# Patient Record
(demographics unavailable — no encounter records)

---

## 2025-01-06 NOTE — ASSESSMENT
[FreeTextEntry1] : Assessment: - Summary : 60-year-old female with right hip arthritis and lumbar radiculopathy, presenting with intermittent back and right hip pain. Patient has not yet received high-quality physical therapy and is hesitant about surgical intervention. - Problems : - Right hip arthritis  - Lumbar radiculopathy  - Chronic pain  - Differential Diagnosis : - Osteoarthritis of the hip  - Lumbar disc herniation  - Spinal stenosis  - Trochanteric bursitis  Plan: - Summary : The treatment plan focuses on conservative management with medication and physical therapy. The patient's preference for non-surgical approaches is taken into account however she is advised to continue to follow up with orthopedics for ongoing evaluation of need for KENA.   - Plan : - Prescribe Naproxen 500mg twice daily as needed  - Continue Tylenol 1g every 8 hours as needed  - Refer to [facility] for high-quality physical therapy  - Follow-up appointment in 6 weeks  - Advised patient to call if unable to schedule PT appointment for assistance  - Discussed potential need for [employer] application  - Monitor progress and reassess need for surgical intervention with Dr. Abreu.   Ralph Perez DO, FAAPMR Attending Physician, Interventional Pain Medicine  Department of Physical Medicine and Rehabilitation Formerly [facility] | [place] [address] [address] [place] [address] 6th [place] [address] Email: [email or web address]  I have spent greater than 30 minutes preparing to see the patient, collecting relevant history, performing a thorough history and physical examination, counseling the patient regarding my findings ordering the appropriate therapies and tests, communicating with other relevant healthcare professionals, documenting my encounter and coordinating care.

## 2025-01-06 NOTE — HISTORY OF PRESENT ILLNESS
[Back] : back [2] : a minimum pain level of 2/10 [6] : a maximum pain level of 6/10 [FreeTextEntry1] : Subjective: - Summary : 60-year-old female presents with intermittent back pain and right hip discomfort. Patient expresses reluctance towards hip replacement surgery and dissatisfaction with previous physical therapy experiences. - Chief Complaint (CC) : Right hip and back pain - History of Present Illness (HPI) : Patient reports intermittent back pain and right hip discomfort. The pain is localized to the right hip area and sometimes extends to the back. No radiation of pain down the leg is reported. The patient describes the pain as 'not really, really bad'. She has been taking Tylenol for pain management. The patient expresses a preference for conservative treatment options before considering hip replacement surgery. - Past Medical History : Right upper lobe mass removal (lung), Right hip arthritis, Lumbar radiculopathy - Past Surgical History : Status post right hip intra-articular injection

## 2025-01-06 NOTE — PHYSICAL EXAM
[FreeTextEntry1] : Gen: A+O x 3 in NAD Psych: Normal mood and affect. Responds appropriately to commands Eyes: Anicteric. No discharge. EOMI. Resp: Breathing unlabored CV: Well Perfused Ext: No c/c/e  Skin: No lesions noted   Gait: Non antalgic, normal reciprocating heel to toe, able to stand on toes and heels. Tandem gait intact  Negative romberg   Stance: No Trendelenburg sign present with single leg stance     Neuro:   Tone: Normal. No clonus. Sensation: Grossly intact to light touch and pinprick bilateral lower limbs. Proprioception: Intact at big toes bilaterally. Reflexes: 2+  symmetric knee jerk, medial hamstring, ankle jerk. Plantars downgoing bilaterally.   MMT:   5/5 in b/l lower extremities      Spine:   Inspection: Alignment is midline, with no evidence of scoliosis. Iliac crest heights and PSIS heights level. No rib hump noted upon forward flexion of the trunk.    Palpation: There is + tenderness over the midline spinous processes, paravertebral muscles, PSIS,   Neg TTP over sciatic notch, greater trochanters bilaterally.     Lumbar ROM: Flexion, extension, side-bending, rotation, limited in most planes  pain with lateral flexion  pain with oblique extension  pain with lateral rotation      Hip ROM:  + Shopping cart sign    -Pain at terminal ROM bilaterally.   -FAIR, FABERE negative bilaterally.            -SLR positive bilaterally    -Crossed SLR positive bilaterally.    -Slump test positive bilaterally.    -Femoral Nerve Stretch test negative bilaterally   -Crossed Femoral Nerve test negative bilaterally        -Facet loading (Kemps Test) did not produce low back pain.   -Midline Sacral Thrust did not produce pain.      -Standing Flexion Test negative   -Seated Flexion Test negative    -SIJ pain not elicited with dropping the involved leg off the table while the contralateral hip was held in flexion (Gaenslens Test)   - Negative Hip extension and ilium rotation (Yeomans Test)   - Negative Iliac Compression and Distraction of the ASIS   -Kelsey Finger Test to SIJ negative on two attempts.

## 2025-02-28 NOTE — HISTORY OF PRESENT ILLNESS
[FreeTextEntry1] : Patient presents today for follow-up overall she is doing well.  She radicular symptoms hip pain is under control has not yet done physical therapy.

## 2025-02-28 NOTE — ASSESSMENT
[FreeTextEntry1] : Patient with spinal stenosis and osteoarthritis of the hip presents today for follow-up as needed physical therapy discussed importance of physical therapy helped her find a location which takes her insurance is convenient for her.  Refilled naproxen 500 twice daily.  Follow-up in 6 weeks  Ralph Perez DO, FAAPMR Attending Physician, Interventional Pain Medicine  Department of Physical Medicine and Rehabilitation [place] [address] [place] [address] [address] [place] [address] Email: [email or web address]  I have spent greater than 30 minutes preparing to see the patient, collecting relevant history, performing a thorough history and physical examination, counseling the patient regarding my findings ordering the appropriate therapies and tests, communicating with other relevant healthcare professionals, documenting my encounter and coordinating care.

## 2025-02-28 NOTE — ASSESSMENT
[FreeTextEntry1] : Patient with spinal stenosis and osteoarthritis of the hip presents today for follow-up as needed physical therapy discussed importance of physical therapy helped her find a location which takes her insurance is convenient for her.  Refilled naproxen 500 twice daily.  Follow-up in 6 weeks  Ralph Perez DO, FAAPMR Attending Physician, Interventional Pain Medicine  Department of Physical Medicine and Rehabilitation [place] [address] [address] [place] [address] [address] [place] [address] Email: [email or web address]  I have spent greater than 30 minutes preparing to see the patient, collecting relevant history, performing a thorough history and physical examination, counseling the patient regarding my findings ordering the appropriate therapies and tests, communicating with other relevant healthcare professionals, documenting my encounter and coordinating care.

## 2025-03-07 NOTE — END OF VISIT
[FreeTextEntry3] : Documented by Maryan Alonzo acting as a scribe for Dr. Merrill Abreu. 03/05/2025  All medical record entries made by the Scribe were at my, Dr. Merrill Abreu, direction and personally dictated by me on 03/05/2025. I have reviewed the chart and agree that the record accurately reflects my personal performance of the history, physical exam, assessment and plan. I have also personally directed, reviewed, and agreed with the chart.

## 2025-03-07 NOTE — PHYSICAL EXAM
[de-identified] : General appearance: well nourished and hydrated, pleasant, alert and oriented x 3, cooperative.   HEENT: normocephalic, EOM intact, wearing mask, external auditory canal clear.   Cardiovascular: no lower leg edema, no varicosities, dorsalis pedis pulses palpable and symmetric.   Lymphatics: no palpable lymphadenopathy, no lymphedema.   Neurologic: sensation is normal, no muscle weakness in upper or lower extremities, patella tendon reflexes present and symmetric.   Dermatologic: skin moist, warm, no rash.   Spine: cervical spine with normal lordosis and painless range of motion, thoracic spine with normal kyphosis and painless range of motion, lumbosacral spine with normal lordosis and painless range of motion.  No tenderness to palpation along midline spine and paraspinal musculature.  Sacroiliac joints nontender bilaterally. Negative SLR and crossed SLR tests bilaterally. Gait: She presents with no assistive device, she transitions easily from seated to standing, and demonstrates a stable, non-antalgic, gait pattern.   Limb lengths:  clinically similar.  Right hip: - Focal soft tissue swelling: none - Ecchymosis: none - Erythema: none - Wounds: none - Tenderness: non tender to palpation - ROM:    - Flexion: 90   - Extension: 0   - Adduction: 5   - Abduction: 15   - Internal rotation in 90 degrees of hip flexion: 5 degrees oblique external   - External rotation in 90 degrees of hip flexion: 20 - AMAIRLIS: very stiff; but painless. - FADIR: very stiff; but painless. - Makenna: negative - Stinchfield: positive - Flexor power: 4+/5 - Abductor power: 5/5   [de-identified] : AP pelvis and right hip x-rays were taken today. These demonstrate for the right hip severe osteoarthritis with bone-on-bone superior articulation, TONNIS 3. There is associated pincer deformity, there's as well as coxa profunda deformity, there's no radiographic evidence of primary osteonecrosis.   Left hip is also visualized and demonstrates mild to moderate osteoarthritis with predominantly inferior medial joint space narrowing.  Their combined pincer and coxa profunda deformities, there is no radiographic evidence of primary osteonecrosis.   The right hip arthritis has worsened as compared to the April 2024 X-rays.

## 2025-03-07 NOTE — PHYSICAL EXAM
[de-identified] : General appearance: well nourished and hydrated, pleasant, alert and oriented x 3, cooperative.   HEENT: normocephalic, EOM intact, wearing mask, external auditory canal clear.   Cardiovascular: no lower leg edema, no varicosities, dorsalis pedis pulses palpable and symmetric.   Lymphatics: no palpable lymphadenopathy, no lymphedema.   Neurologic: sensation is normal, no muscle weakness in upper or lower extremities, patella tendon reflexes present and symmetric.   Dermatologic: skin moist, warm, no rash.   Spine: cervical spine with normal lordosis and painless range of motion, thoracic spine with normal kyphosis and painless range of motion, lumbosacral spine with normal lordosis and painless range of motion.  No tenderness to palpation along midline spine and paraspinal musculature.  Sacroiliac joints nontender bilaterally. Negative SLR and crossed SLR tests bilaterally. Gait: She presents with no assistive device, she transitions easily from seated to standing, and demonstrates a stable, non-antalgic, gait pattern.   Limb lengths:  clinically similar.  Right hip: - Focal soft tissue swelling: none - Ecchymosis: none - Erythema: none - Wounds: none - Tenderness: non tender to palpation - ROM:    - Flexion: 90   - Extension: 0   - Adduction: 5   - Abduction: 15   - Internal rotation in 90 degrees of hip flexion: 5 degrees oblique external   - External rotation in 90 degrees of hip flexion: 20 - AMARILIS: very stiff; but painless. - FADIR: very stiff; but painless. - Makenna: negative - Stinchfield: positive - Flexor power: 4+/5 - Abductor power: 5/5   [de-identified] : AP pelvis and right hip x-rays were taken today. These demonstrate for the right hip severe osteoarthritis with bone-on-bone superior articulation, TONNIS 3. There is associated pincer deformity, there's as well as coxa profunda deformity, there's no radiographic evidence of primary osteonecrosis.   Left hip is also visualized and demonstrates mild to moderate osteoarthritis with predominantly inferior medial joint space narrowing.  Their combined pincer and coxa profunda deformities, there is no radiographic evidence of primary osteonecrosis.   The right hip arthritis has worsened as compared to the April 2024 X-rays.

## 2025-03-07 NOTE — HISTORY OF PRESENT ILLNESS
[de-identified] : 03/05/2025: 60-year-old female following up for right hip osteoarthritis we last saw her in July of 2024, at which time we elected to continue with non-surgical treatment options. She's been following primarily with Dr. Greenberg since then, both for spinal stenosis and her right hip arthritis. She underwent some sort of spinal injection last year, which she cannot say was helpful or not, given that she was helping her daughter move at that time, which was a very physical endeavor. She continues to use naproxen as needed for pain about two to three times a week, as needed. She did her last physical therapy course at the end of 2024 and is planning to resume a new series, this upcoming Monday. She reports that she mostly keeps to herself at home, but does go out when necessary for shopping and doctor's visits. She reports that the hip pain is not so much her limiting factor as is chronic shortness of breath, as she is a lung cancer survivor. She continues to ambulate without the use of any assistive devices. Overall, she feels that the intensity of her right hip pain is about the same as it was the last time that we saw each other without significant progression or improvement.  07/16/2024: 60 year old female presenting for follow up evaluation of right hip osteoarthritis. We last saw her 2 months ago at which time she elected to continue with conservative treatment modalities, having been uninterested at that time in discussing surgical intervention. She did follow up with Dr. Greenberg regarding lumbar degenerative disease and did undergo an epidural injection on 07/02. She felt the injection was helpful for her back pain and some of her hip pain. She does report ongoing persistence of her hip and thigh pain on the right side. She is taking naproxen occasionally as needed. She has not participated in PT in at least 6 weeks. She reports that she self-discontinued PT, because she did not feel that it was helpful. She does report that she walks a lot and does some home exercises.  05/14/2024: 59 year old female presenting for follow up evaluation of right hip osteoarthritis to review her right hip MRI and clinical progress as compared to her first visit 2 weeks ago. She has been attending PT but does not feel she is getting any benefit. She reports her pain has not changed since the last visit. She is taking Naproxen and Tylenol as needed for pain.  04/30/2024: 59 year old female presenting for initial evaluation of right hip pain present for the past year, which is primarily localized over the anterior groin. Pain is worse when walking, tying shoelaces, and transitioning from sitting to standing. She rates pain at 9/10. She is taking naproxen and Tylenol for relief. She went for PT until 3 weeks ago, and will be starting new PT session later this week. She has a walking distance limitation of 1 city block. She also notes some lumbar pain as well. She reports a history of some type of lumbar injection 10 years ago, which was helpful at that time for pain.  PMH significant for history of lung cancer, in remission for the past 3 years History of hysterectomy No relevant allergies

## 2025-03-07 NOTE — HISTORY OF PRESENT ILLNESS
[de-identified] : 03/05/2025: 60-year-old female following up for right hip osteoarthritis we last saw her in July of 2024, at which time we elected to continue with non-surgical treatment options. She's been following primarily with Dr. Greenberg since then, both for spinal stenosis and her right hip arthritis. She underwent some sort of spinal injection last year, which she cannot say was helpful or not, given that she was helping her daughter move at that time, which was a very physical endeavor. She continues to use naproxen as needed for pain about two to three times a week, as needed. She did her last physical therapy course at the end of 2024 and is planning to resume a new series, this upcoming Monday. She reports that she mostly keeps to herself at home, but does go out when necessary for shopping and doctor's visits. She reports that the hip pain is not so much her limiting factor as is chronic shortness of breath, as she is a lung cancer survivor. She continues to ambulate without the use of any assistive devices. Overall, she feels that the intensity of her right hip pain is about the same as it was the last time that we saw each other without significant progression or improvement.  07/16/2024: 60 year old female presenting for follow up evaluation of right hip osteoarthritis. We last saw her 2 months ago at which time she elected to continue with conservative treatment modalities, having been uninterested at that time in discussing surgical intervention. She did follow up with Dr. Greenberg regarding lumbar degenerative disease and did undergo an epidural injection on 07/02. She felt the injection was helpful for her back pain and some of her hip pain. She does report ongoing persistence of her hip and thigh pain on the right side. She is taking naproxen occasionally as needed. She has not participated in PT in at least 6 weeks. She reports that she self-discontinued PT, because she did not feel that it was helpful. She does report that she walks a lot and does some home exercises.  05/14/2024: 59 year old female presenting for follow up evaluation of right hip osteoarthritis to review her right hip MRI and clinical progress as compared to her first visit 2 weeks ago. She has been attending PT but does not feel she is getting any benefit. She reports her pain has not changed since the last visit. She is taking Naproxen and Tylenol as needed for pain.  04/30/2024: 59 year old female presenting for initial evaluation of right hip pain present for the past year, which is primarily localized over the anterior groin. Pain is worse when walking, tying shoelaces, and transitioning from sitting to standing. She rates pain at 9/10. She is taking naproxen and Tylenol for relief. She went for PT until 3 weeks ago, and will be starting new PT session later this week. She has a walking distance limitation of 1 city block. She also notes some lumbar pain as well. She reports a history of some type of lumbar injection 10 years ago, which was helpful at that time for pain.  PMH significant for history of lung cancer, in remission for the past 3 years History of hysterectomy No relevant allergies

## 2025-03-07 NOTE — DISCUSSION/SUMMARY
[de-identified] : IMP: 60-year-old female with right hip osteoarthritis. - She remains clinically stable even though she has had some radiographic deterioration; neither she nor I think that hip replacement is necessary at this time. - I did encourage her to continue with physical therapy as currently planned and to continue using Tylenol and naproxen as needed. - She will return to the office when she is back from her trip to [place] in the late summer time, with no new x-rays needed.  - We reviewed that she is a candidate to consider hip replacement whenever her symptoms worsen, which they eventually will.

## 2025-03-07 NOTE — DISCUSSION/SUMMARY
[de-identified] : IMP: 60-year-old female with right hip osteoarthritis. - She remains clinically stable even though she has had some radiographic deterioration; neither she nor I think that hip replacement is necessary at this time. - I did encourage her to continue with physical therapy as currently planned and to continue using Tylenol and naproxen as needed. - She will return to the office when she is back from her trip to [place] in the late summer time, with no new x-rays needed.  - We reviewed that she is a candidate to consider hip replacement whenever her symptoms worsen, which they eventually will.

## 2025-03-20 NOTE — DISCUSSION/SUMMARY
[de-identified] : IMP: 60-year-old female with severe right hip osteoarthritis. - I agree that it would be reasonable for her to undergo a right total hip replacement at this time.  - We discussed the details of the procedure, the expected recovery period, and the expected outcome. We discussed the likelihood of satisfaction after complete recovery, and the potential causes of dissatisfaction. The importance of active patient participation in the rehabilitation protocol was emphasized, along with its influence on short and long-term outcomes. We discussed the risks, benefits, and alternatives of surgery at length. Specific risks of total hip replacement were discussed in detail. We discussed the risk of surgical site complications including but not limited to: surgical site infection, wound healing complications, bone fracture, tendon or ligament injury, neurovascular injury, hemorrhage, postoperative stiffness or instability, persistent pain, limb length discrepancy, and need for reoperation. We discussed surgical blood loss and the possible need for blood transfusion. We discussed the risk of perioperative medical complications, including but not limited to catheter-associated urinary tract infection, venous thromboembolism and other cardiopulmonary complications. We discussed anesthetic options and the risk of anesthesia-related complications. We discussed the potential benefits of surgery including the potential to improve the current clinical condition through operative intervention. I emphasized that there are alternatives to surgical intervention including continued conservative management, though such a course could yield less than optimal results in this particular patient. A model was used to demonstrate the operation and to discuss bearing surfaces of the implants. We discussed implant fixation methods; my plan would be to use fully cementless fixation in this case. We discussed the various surgical approaches to the hip; I think that an anterior approach would be appropriate in this case. We discussed that it is relatively common following anterior approach hip arthroplasty to develop numbness of the lateral thigh secondary to injury to the branches of the lateral femoral cutaneous nerve, which in most cases does improve over the course of the first postoperative year, but which can also be permanent. We discussed the durability of prosthetic hips and limitations related to wear, osteolysis and loosening. All questions were answered to the patient's satisfaction. The patient was given a copy of my preoperative packet with additional information about the procedure. I asked the patient to either call back or schedule a followup appointment for any additional questions or concerns regarding the procedure. - She will be booked for surgery at a convenient time with routine medical clearance.

## 2025-03-20 NOTE — PHYSICAL EXAM
[de-identified] : General appearance: well nourished and hydrated, pleasant, alert and oriented x 3, cooperative. HEENT: normocephalic, EOM intact, wearing mask, external auditory canal clear. Cardiovascular: no lower leg edema, no varicosities, dorsalis pedis pulses palpable and symmetric. Lymphatics: no palpable lymphadenopathy, no lymphedema. Neurologic: sensation is normal, no muscle weakness in upper or lower extremities, patella tendon reflexes present and symmetric. Dermatologic: skin moist, warm, no rash. Spine: cervical spine with normal lordosis and painless range of motion, thoracic spine with normal kyphosis and painless range of motion, lumbosacral spine with normal lordosis and painless range of motion. No tenderness to palpation along midline spine and paraspinal musculature. Sacroiliac joints nontender bilaterally. Negative SLR and crossed SLR tests bilaterally. Gait: She presents with no assistive device, she transitions easily from seated to standing, and demonstrates a stable, non-antalgic, gait pattern.  Limb lengths: clinically similar.  Right hip: - Focal soft tissue swelling: none - Ecchymosis: none - Erythema: none - Wounds: none - Tenderness: non tender to palpation - ROM: - Flexion: 90 - Extension: 0 - Adduction: 5 - Abduction: 15 - Internal rotation in 90 degrees of hip flexion: 5 degrees oblique external - External rotation in 90 degrees of hip flexion: 20 - AMARILIS: very stiff; but painless. - FADIR: very stiff; but painless. - Makenna: negative - Stinchfield: positive - Flexor power: 4+/5 - Abductor power: 5/5

## 2025-03-20 NOTE — HISTORY OF PRESENT ILLNESS
[de-identified] : 03/19/2025: 60-year-old female following up for right hip osteoarthritis, we last saw her two weeks ago, at which time she preferred to continue with non-surgical  management. She reports that over the past couple of weeks, she's had a change of heart. She does not wish to continue "popping pills", which at this point are  mostly naproxen. She does not wish to continue worrying about her hips, and she feels that if hip replacement will eventually be required, she would rather take  care of this now rather than having to deal with it later. She was previously planning a trip to [place] around her birthday in June. She is now interested in canceling  those plans so she can pursue right total hip replacement, and she would like to have a discussion regarding this today   03/05/2025: 60-year-old female following up for right hip osteoarthritis we last saw her in July of 2024, at which time we elected to continue with non-surgical treatment options. She's been following primarily with Dr. Greenberg since then, both for spinal stenosis and her right hip arthritis. She underwent some sort of spinal injection last year, which she cannot say was helpful or not, given that she was helping her daughter move at that time, which was a very physical endeavor. She continues to use naproxen as needed for pain about two to three times a week, as needed. She did her last physical therapy course at the end of 2024 and is planning to resume a new series, this upcoming Monday. She reports that she mostly keeps to herself at home, but does go out when necessary for shopping and doctor's visits. She reports that the hip pain is not so much her limiting factor as is chronic shortness of breath, as she is a lung cancer survivor. She continues to ambulate without the use of any assistive devices. Overall, she feels that the intensity of her right hip pain is about the same as it was the last time that we saw each other without significant progression or improvement.  07/16/2024: 60 year old female presenting for follow up evaluation of right hip osteoarthritis. We last saw her 2 months ago at which time she elected to continue with conservative treatment modalities, having been uninterested at that time in discussing surgical intervention. She did follow up with Dr. Greenberg regarding lumbar degenerative disease and did undergo an epidural injection on 07/02. She felt the injection was helpful for her back pain and some of her hip pain. She does report ongoing persistence of her hip and thigh pain on the right side. She is taking naproxen occasionally as needed. She has not participated in PT in at least 6 weeks. She reports that she self-discontinued PT, because she did not feel that it was helpful. She does report that she walks a lot and does some home exercises.  05/14/2024: 59 year old female presenting for follow up evaluation of right hip osteoarthritis to review her right hip MRI and clinical progress as compared to her first visit 2 weeks ago. She has been attending PT but does not feel she is getting any benefit. She reports her pain has not changed since the last visit. She is taking Naproxen and Tylenol as needed for pain.  04/30/2024: 59 year old female presenting for initial evaluation of right hip pain present for the past year, which is primarily localized over the anterior groin. Pain is worse when walking, tying shoelaces, and transitioning from sitting to standing. She rates pain at 9/10. She is taking naproxen and Tylenol for relief. She went for PT until 3 weeks ago, and will be starting new PT session later this week. She has a walking distance limitation of 1 city block. She also notes some lumbar pain as well. She reports a history of some type of lumbar injection 10 years ago, which was helpful at that time for pain.  PMH significant for history of lung cancer, in remission for the past 3 years History of hysterectomy No relevant allergies

## 2025-03-20 NOTE — DISCUSSION/SUMMARY
[de-identified] : IMP: 60-year-old female with severe right hip osteoarthritis. - I agree that it would be reasonable for her to undergo a right total hip replacement at this time.  - We discussed the details of the procedure, the expected recovery period, and the expected outcome. We discussed the likelihood of satisfaction after complete recovery, and the potential causes of dissatisfaction. The importance of active patient participation in the rehabilitation protocol was emphasized, along with its influence on short and long-term outcomes. We discussed the risks, benefits, and alternatives of surgery at length. Specific risks of total hip replacement were discussed in detail. We discussed the risk of surgical site complications including but not limited to: surgical site infection, wound healing complications, bone fracture, tendon or ligament injury, neurovascular injury, hemorrhage, postoperative stiffness or instability, persistent pain, limb length discrepancy, and need for reoperation. We discussed surgical blood loss and the possible need for blood transfusion. We discussed the risk of perioperative medical complications, including but not limited to catheter-associated urinary tract infection, venous thromboembolism and other cardiopulmonary complications. We discussed anesthetic options and the risk of anesthesia-related complications. We discussed the potential benefits of surgery including the potential to improve the current clinical condition through operative intervention. I emphasized that there are alternatives to surgical intervention including continued conservative management, though such a course could yield less than optimal results in this particular patient. A model was used to demonstrate the operation and to discuss bearing surfaces of the implants. We discussed implant fixation methods; my plan would be to use fully cementless fixation in this case. We discussed the various surgical approaches to the hip; I think that an anterior approach would be appropriate in this case. We discussed that it is relatively common following anterior approach hip arthroplasty to develop numbness of the lateral thigh secondary to injury to the branches of the lateral femoral cutaneous nerve, which in most cases does improve over the course of the first postoperative year, but which can also be permanent. We discussed the durability of prosthetic hips and limitations related to wear, osteolysis and loosening. All questions were answered to the patient's satisfaction. The patient was given a copy of my preoperative packet with additional information about the procedure. I asked the patient to either call back or schedule a followup appointment for any additional questions or concerns regarding the procedure. - She will be booked for surgery at a convenient time with routine medical clearance.

## 2025-03-20 NOTE — HISTORY OF PRESENT ILLNESS
[de-identified] : 03/19/2025: 60-year-old female following up for right hip osteoarthritis, we last saw her two weeks ago, at which time she preferred to continue with non-surgical  management. She reports that over the past couple of weeks, she's had a change of heart. She does not wish to continue "popping pills", which at this point are  mostly naproxen. She does not wish to continue worrying about her hips, and she feels that if hip replacement will eventually be required, she would rather take  care of this now rather than having to deal with it later. She was previously planning a trip to [place] around her birthday in June. She is now interested in canceling  those plans so she can pursue right total hip replacement, and she would like to have a discussion regarding this today   03/05/2025: 60-year-old female following up for right hip osteoarthritis we last saw her in July of 2024, at which time we elected to continue with non-surgical treatment options. She's been following primarily with Dr. Greenberg since then, both for spinal stenosis and her right hip arthritis. She underwent some sort of spinal injection last year, which she cannot say was helpful or not, given that she was helping her daughter move at that time, which was a very physical endeavor. She continues to use naproxen as needed for pain about two to three times a week, as needed. She did her last physical therapy course at the end of 2024 and is planning to resume a new series, this upcoming Monday. She reports that she mostly keeps to herself at home, but does go out when necessary for shopping and doctor's visits. She reports that the hip pain is not so much her limiting factor as is chronic shortness of breath, as she is a lung cancer survivor. She continues to ambulate without the use of any assistive devices. Overall, she feels that the intensity of her right hip pain is about the same as it was the last time that we saw each other without significant progression or improvement.  07/16/2024: 60 year old female presenting for follow up evaluation of right hip osteoarthritis. We last saw her 2 months ago at which time she elected to continue with conservative treatment modalities, having been uninterested at that time in discussing surgical intervention. She did follow up with Dr. Greenberg regarding lumbar degenerative disease and did undergo an epidural injection on 07/02. She felt the injection was helpful for her back pain and some of her hip pain. She does report ongoing persistence of her hip and thigh pain on the right side. She is taking naproxen occasionally as needed. She has not participated in PT in at least 6 weeks. She reports that she self-discontinued PT, because she did not feel that it was helpful. She does report that she walks a lot and does some home exercises.  05/14/2024: 59 year old female presenting for follow up evaluation of right hip osteoarthritis to review her right hip MRI and clinical progress as compared to her first visit 2 weeks ago. She has been attending PT but does not feel she is getting any benefit. She reports her pain has not changed since the last visit. She is taking Naproxen and Tylenol as needed for pain.  04/30/2024: 59 year old female presenting for initial evaluation of right hip pain present for the past year, which is primarily localized over the anterior groin. Pain is worse when walking, tying shoelaces, and transitioning from sitting to standing. She rates pain at 9/10. She is taking naproxen and Tylenol for relief. She went for PT until 3 weeks ago, and will be starting new PT session later this week. She has a walking distance limitation of 1 city block. She also notes some lumbar pain as well. She reports a history of some type of lumbar injection 10 years ago, which was helpful at that time for pain.  PMH significant for history of lung cancer, in remission for the past 3 years History of hysterectomy No relevant allergies

## 2025-03-20 NOTE — REASON FOR VISIT
[Follow-Up Visit] : a follow-up visit for [FreeTextEntry2] : right hip pain and stiffness and Lumbar spinal stenosis

## 2025-03-20 NOTE — END OF VISIT
[FreeTextEntry3] : Documented by Maryan Alonzo acting as a scribe for Dr. Merrill Abreu. 03/19/2025   All medical record entries made by the Scribe were at my, Dr. Merrill Abreu, direction and personally dictated by me on 03/19/2025. I have reviewed the chart and agree that the record accurately reflects my personal performance of the history, physical exam, assessment and plan. I have also personally directed, reviewed, and agreed with the chart.

## 2025-03-20 NOTE — PHYSICAL EXAM
[de-identified] : General appearance: well nourished and hydrated, pleasant, alert and oriented x 3, cooperative. HEENT: normocephalic, EOM intact, wearing mask, external auditory canal clear. Cardiovascular: no lower leg edema, no varicosities, dorsalis pedis pulses palpable and symmetric. Lymphatics: no palpable lymphadenopathy, no lymphedema. Neurologic: sensation is normal, no muscle weakness in upper or lower extremities, patella tendon reflexes present and symmetric. Dermatologic: skin moist, warm, no rash. Spine: cervical spine with normal lordosis and painless range of motion, thoracic spine with normal kyphosis and painless range of motion, lumbosacral spine with normal lordosis and painless range of motion. No tenderness to palpation along midline spine and paraspinal musculature. Sacroiliac joints nontender bilaterally. Negative SLR and crossed SLR tests bilaterally. Gait: She presents with no assistive device, she transitions easily from seated to standing, and demonstrates a stable, non-antalgic, gait pattern.  Limb lengths: clinically similar.  Right hip: - Focal soft tissue swelling: none - Ecchymosis: none - Erythema: none - Wounds: none - Tenderness: non tender to palpation - ROM: - Flexion: 90 - Extension: 0 - Adduction: 5 - Abduction: 15 - Internal rotation in 90 degrees of hip flexion: 5 degrees oblique external - External rotation in 90 degrees of hip flexion: 20 - AMARILIS: very stiff; but painless. - FADIR: very stiff; but painless. - Makenna: negative - Stinchfield: positive - Flexor power: 4+/5 - Abductor power: 5/5

## 2025-04-25 NOTE — END OF VISIT
[FreeTextEntry3] : Documented by Maryan Alonzo acting as a scribe for Dr. Merrill Abreu. 04/25/2025  All medical record entries made by the Scribe were at my, Dr. Merrill Abreu, direction and personally dictated by me on 04/25/2025. I have reviewed the chart and agree that the record accurately reflects my personal performance of the history, physical exam, assessment and plan. I have also personally directed, reviewed, and agreed with the chart.

## 2025-04-25 NOTE — HISTORY OF PRESENT ILLNESS
[de-identified] : Patient presents for first post-operative visit of right total hip replacement. Surgery was performed on 04/10/2025. Patient is 2 weeks status post. [de-identified] : 04/25/2025: 60 year-old female who is now about two weeks out from a right hip replacement. Patient reports that she's doing quite well, she states that her pain is mild and controlled by Tylenol alone. She is getting around with the help of a cane but feels that she does not need it, and she has been participating with home physical therapy. She has not taken any narcotics since 5 days Post-Op. She's continuing to take the aspirin 81 BID. [de-identified] : General appearance: well nourished and hydrated, pleasant, alert and oriented x 3, cooperative.   HEENT: normocephalic, EOM intact, wearing mask, external auditory canal clear.   Cardiovascular: no lower leg edema, no varicosities, dorsalis pedis pulses palpable and symmetric.   Lymphatics: no palpable lymphadenopathy, no lymphedema.   Neurologic: sensation is normal, no muscle weakness in upper or lower extremities, patella tendon reflexes present and symmetric.   Dermatologic: skin moist, warm, no rash.   Spine: cervical spine with normal lordosis and painless range of motion, thoracic spine with normal kyphosis and painless range of motion, lumbosacral spine with normal lordosis and painless range of motion.  No tenderness to palpation along midline spine and paraspinal musculature.  Sacroiliac joints nontender bilaterally. Negative SLR and crossed SLR tests bilaterally. Gait: She presents today with a cane.  Without the cane, she demonstrates a stable and non antalgic gait pattern, she's only minimally cautious on the right side, she has a negative trendelenburg sign.  Limb lengths: clinically equal  Right hip: - Focal soft tissue swelling: none - Ecchymosis: none - Erythema: none - Wounds: A well-healed midline incision. There is very mild brandi-incisional scabbing throughout; overall benign appearing. - Tenderness: none - ROM:    - Flexion: 90   - Extension: 0   - Adduction: 15   - Abduction: 25   - Internal rotation in 90 degrees of hip flexion: 15   - External rotation in 90 degrees of hip flexion: 35 - AMARILIS: mildly stiff but painless. - FADIR: mildly stiff but painless. - Makenna: negative - Stinchfield: positive - Flexor power: 3+/5 - Abductor power: 4/5   [de-identified] : IMP: 60-year-old female now about two weeks status post right total hip replacement. Overall, doing well at this time, without signs or symptoms of post-operative complications.  [de-identified] : - We will transition at this point to outpatient physical therapy.  - She'll continue with non-opioid multimodal pain management as needed.  - She should finish out a total of 30 days of aspirin for DVT prophylaxis.  - She'll wean the use of her cane as tolerated. - I encourage her to increase her daily steps with a target of 8 to 10,000 steps per day on a regular daily basis. - We'll follow up next in four weeks with repeat right hip X-rays.

## 2025-05-25 NOTE — END OF VISIT
[FreeTextEntry3] : Documented by Maryan Alonzo acting as a scribe for Dr. Merrill Abreu. 05/23/2025  All medical record entries made by the Scribe were at my, Dr. Merrill Abreu, direction and personally dictated by me on 05/23/2025. I have reviewed the chart and agree that the record accurately reflects my personal performance of the history, physical exam, assessment and plan. I have also personally directed, reviewed, and agreed with the chart.

## 2025-05-25 NOTE — HISTORY OF PRESENT ILLNESS
[de-identified] :  Patient presents for second post-operative visit for right total hip replacement, direct anterior approach. surgical date is 04/10/2025. Patient is 6 weeks status post. [de-identified] : 05/23/2025: 60-year-old female following up on her second post-operative visit following right total hip replacement performed on April 10, 2025. She's now about six weeks out. She's been continuing to participate with physical therapy, though she was out of therapy for the last two weeks, secondary to what sounds like an upper respiratory infection, which at this point is mostly resolved. She reports that she really has no pain to speak of in the right hip, but she does have ongoing symptoms in her low back and her right paralumbar region, for which she continues to use Tylenol and Oxycodone occasionally. She does not even use more than five milligrams of oxycodone per day. She was previously under treatment with Dr. Greenberg, but has not seen him since the end of February as she was prioritizing her hip rehabilitation. She's ambulating without the use of any assistive devices.  [de-identified] : Gait: She presents with no assisted device, she transitions easily from seated to standing, she demonstrates a stable non antalic gait pattern.  Limb lengths: clinically equal  Right hip: - Focal soft tissue swelling: none - Ecchymosis: none - Erythema: none - Wounds:  a well-healed anterior incision benign appearing, there was a single spitting Vicryl suture that was removed today by me. - Tenderness: none - ROM:    - Flexion: 100   - Extension: 0   - Adduction: 15   - Abduction: 40   - Internal rotation in 90 degrees of hip flexion: 10   - External rotation in 90 degrees of hip flexion: 45 - AMARILIS: negative - FADIR: negative - Makenna: negative - Stinchfield: positive - Flexor power: 4/5 - Abductor power: 4+/5; and symmetric.    [de-identified] : Right hip X-rays were repeated today, these demonstrate stable appearance of her right total hip replacement as compared to intra-op imaging without evidence of mechanical complications. Left hip continues to demonstrate mild osteoarthritis with predominantly inferior medial joint space narrowing, TONNIS  1,  without associated deformity or osteonecrosis. [de-identified] : IMP: 60 year-old female now about six weeks status post right total hip replacement. Overall, doing very well for this stage in recovery, with persistent ongoing low back pain and right sacroiliac pain. [de-identified] : - Overall she's doing very well. She should return to physical therapy and continue with the home exercise program.  - I did recommend that she follow up with Dr. Greenberg regarding her low back pain, and she should follow up next with me in two months with a repeat right hip x-rays.  - We've reviewed that she has cleared, from my perspective, to fly at this time.

## 2025-05-25 NOTE — HISTORY OF PRESENT ILLNESS
[de-identified] :  Patient presents for second post-operative visit for right total hip replacement, direct anterior approach. surgical date is 04/10/2025. Patient is 6 weeks status post. [de-identified] : 05/23/2025: 60-year-old female following up on her second post-operative visit following right total hip replacement performed on April 10, 2025. She's now about six weeks out. She's been continuing to participate with physical therapy, though she was out of therapy for the last two weeks, secondary to what sounds like an upper respiratory infection, which at this point is mostly resolved. She reports that she really has no pain to speak of in the right hip, but she does have ongoing symptoms in her low back and her right paralumbar region, for which she continues to use Tylenol and Oxycodone occasionally. She does not even use more than five milligrams of oxycodone per day. She was previously under treatment with Dr. Greenberg, but has not seen him since the end of February as she was prioritizing her hip rehabilitation. She's ambulating without the use of any assistive devices.  [de-identified] : Gait: She presents with no assisted device, she transitions easily from seated to standing, she demonstrates a stable non antalic gait pattern.  Limb lengths: clinically equal  Right hip: - Focal soft tissue swelling: none - Ecchymosis: none - Erythema: none - Wounds:  a well-healed anterior incision benign appearing, there was a single spitting Vicryl suture that was removed today by me. - Tenderness: none - ROM:    - Flexion: 100   - Extension: 0   - Adduction: 15   - Abduction: 40   - Internal rotation in 90 degrees of hip flexion: 10   - External rotation in 90 degrees of hip flexion: 45 - AMARILIS: negative - FADIR: negative - Makenna: negative - Stinchfield: positive - Flexor power: 4/5 - Abductor power: 4+/5; and symmetric.    [de-identified] : Right hip X-rays were repeated today, these demonstrate stable appearance of her right total hip replacement as compared to intra-op imaging without evidence of mechanical complications. Left hip continues to demonstrate mild osteoarthritis with predominantly inferior medial joint space narrowing, TONNIS  1,  without associated deformity or osteonecrosis. [de-identified] : IMP: 60 year-old female now about six weeks status post right total hip replacement. Overall, doing very well for this stage in recovery, with persistent ongoing low back pain and right sacroiliac pain. [de-identified] : - Overall she's doing very well. She should return to physical therapy and continue with the home exercise program.  - I did recommend that she follow up with Dr. Greenberg regarding her low back pain, and she should follow up next with me in two months with a repeat right hip x-rays.  - We've reviewed that she has cleared, from my perspective, to fly at this time.

## 2025-07-25 NOTE — PHYSICAL EXAM
[de-identified] : General appearance: well nourished and hydrated, pleasant, alert and oriented x 3, cooperative.   HEENT: normocephalic, EOM intact, wearing mask, external auditory canal clear.   Cardiovascular: no lower leg edema, no varicosities, dorsalis pedis pulses palpable and symmetric.   Lymphatics: no palpable lymphadenopathy, no lymphedema.   Neurologic: sensation is normal, no muscle weakness in upper or lower extremities, patella tendon reflexes present and symmetric.   Dermatologic: skin moist, warm, no rash.   Spine: cervical spine with normal lordosis and painless range of motion, thoracic spine with normal kyphosis and painless range of motion, lumbosacral spine with normal lordosis and painless range of motion.  No tenderness to palpation along midline spine and paraspinal musculature.  Sacroiliac joints nontender bilaterally. Negative SLR and crossed SLR tests bilaterally. Gait:  She presents with no assistive device, and transitions easily from seated to standing. She demonstrates a stable non antalgic gate pattern.  Limb lengths: clinically equal  Right hip:  - Focal soft tissue swelling: none - Ecchymosis: none - Erythema: none - Wounds: A well healed anterior incision, benign appearing. - Tenderness: non tender to palpation. - ROM:    - Flexion: 110   - Extension: 0   - Adduction: 10   - Abduction: 55   - Internal rotation in 90 degrees of hip flexion: 15   - External rotation in 90 degrees of hip flexion: 55 - AMARILIS: negative - FADIR: negative - Makenna: negative - Stinchfield: positive - Flexor power: 4/5 - Abductor power: 5/5   [de-identified] : AP pelvis and right hip x-rays were taken today. These demonstrate stable appearance of her right total hip replacement as compared to prior imaging, without evidence of mechanical complication. Left hip demonstrates mild to moderate osteoarthritis, TONNIS 1-2, without associated deformity or osteonecrosis.

## 2025-07-25 NOTE — DISCUSSION/SUMMARY
[de-identified] : IMP: 61 year old female, now about 3.5 months status post right total hip replacement, overall doing well at this stage with ongoing lumbar spondylosis. - She's doing very well overall. - In anticipation of her upcoming dental work, I gave her a prescription for some amoxicillin and instructions on how to use it. - She should continue with her active home exercise program and may follow up here at the 1 year daija in April 2026 with repeat right hip x-rays.

## 2025-07-25 NOTE — HISTORY OF PRESENT ILLNESS
[de-identified] : R KENA 4/10/25  07/25/2025:  61 year old female following up on her third post-operative visit following right total hip replacement on April 10, 2025. She reports that the hip itself is doing well, she has no pain with it. She's been staying as active as she can and walking on a regular daily basis without the use of any assistive devices. She has been continuing to deal with low back pain under the management of Dr. Greenberg. An epidural injection was recommended, but she has not yet proceeded with this, given that she was dealing with a tooth infection. She does report that she has been indicated for removal of her tooth by her dentist, and she does need prophylactic antibiotics for this.   03/19/2025: 60-year-old female following up for right hip osteoarthritis, we last saw her two weeks ago, at which time she preferred to continue with non-surgical management. She reports that over the past couple of weeks, she's had a change of heart. She does not wish to continue "popping pills", which at this point are mostly naproxen. She does not wish to continue worrying about her hips, and she feels that if hip replacement will eventually be required, she would rather take care of this now rather than having to deal with it later. She was previously planning a trip to [place] around her birthday in June. She is now interested in canceling those plans so she can pursue right total hip replacement, and she would like to have a discussion regarding this today  03/05/2025: 60-year-old female following up for right hip osteoarthritis we last saw her in July of 2024, at which time we elected to continue with non-surgical treatment options. She's been following primarily with Dr. Greenberg since then, both for spinal stenosis and her right hip arthritis. She underwent some sort of spinal injection last year, which she cannot say was helpful or not, given that she was helping her daughter move at that time, which was a very physical endeavor. She continues to use naproxen as needed for pain about two to three times a week, as needed. She did her last physical therapy course at the end of 2024 and is planning to resume a new series, this upcoming Monday. She reports that she mostly keeps to herself at home, but does go out when necessary for shopping and doctor's visits. She reports that the hip pain is not so much her limiting factor as is chronic shortness of breath, as she is a lung cancer survivor. She continues to ambulate without the use of any assistive devices. Overall, she feels that the intensity of her right hip pain is about the same as it was the last time that we saw each other without significant progression or improvement.  07/16/2024: 60 year old female presenting for follow up evaluation of right hip osteoarthritis. We last saw her 2 months ago at which time she elected to continue with conservative treatment modalities, having been uninterested at that time in discussing surgical intervention. She did follow up with Dr. Greenberg regarding lumbar degenerative disease and did undergo an epidural injection on 07/02. She felt the injection was helpful for her back pain and some of her hip pain. She does report ongoing persistence of her hip and thigh pain on the right side. She is taking naproxen occasionally as needed. She has not participated in PT in at least 6 weeks. She reports that she self-discontinued PT, because she did not feel that it was helpful. She does report that she walks a lot and does some home exercises.  05/14/2024: 59 year old female presenting for follow up evaluation of right hip osteoarthritis to review her right hip MRI and clinical progress as compared to her first visit 2 weeks ago. She has been attending PT but does not feel she is getting any benefit. She reports her pain has not changed since the last visit. She is taking Naproxen and Tylenol as needed for pain.  04/30/2024: 59 year old female presenting for initial evaluation of right hip pain present for the past year, which is primarily localized over the anterior groin. Pain is worse when walking, tying shoelaces, and transitioning from sitting to standing. She rates pain at 9/10. She is taking naproxen and Tylenol for relief. She went for PT until 3 weeks ago, and will be starting new PT session later this week. She has a walking distance limitation of 1 city block. She also notes some lumbar pain as well. She reports a history of some type of lumbar injection 10 years ago, which was helpful at that time for pain.  PMH significant for history of lung cancer, in remission for the past 3 years History of hysterectomy No relevant allergies

## 2025-07-25 NOTE — END OF VISIT
[FreeTextEntry3] : Documented by Maryan Alonzo acting as a scribe for Dr. Merrill Abreu. 07/25/2025  All medical record entries made by the Scribe were at my, Dr. Merrill Abreu, direction and personally dictated by me on 07/25/2025. I have reviewed the chart and agree that the record accurately reflects my personal performance of the history, physical exam, assessment and plan. I have also personally directed, reviewed, and agreed with the chart.